# Patient Record
Sex: MALE | Race: OTHER | URBAN - METROPOLITAN AREA
[De-identification: names, ages, dates, MRNs, and addresses within clinical notes are randomized per-mention and may not be internally consistent; named-entity substitution may affect disease eponyms.]

---

## 2017-01-31 ENCOUNTER — OUTPATIENT (OUTPATIENT)
Dept: OUTPATIENT SERVICES | Facility: HOSPITAL | Age: 32
LOS: 1 days | Discharge: HOME | End: 2017-01-31

## 2017-06-27 DIAGNOSIS — Z01.21 ENCOUNTER FOR DENTAL EXAMINATION AND CLEANING WITH ABNORMAL FINDINGS: ICD-10-CM

## 2017-10-24 ENCOUNTER — RESULT REVIEW (OUTPATIENT)
Age: 32
End: 2017-10-24

## 2017-10-24 ENCOUNTER — APPOINTMENT (OUTPATIENT)
Dept: INTERNAL MEDICINE | Facility: CLINIC | Age: 32
End: 2017-10-24

## 2017-10-24 ENCOUNTER — OUTPATIENT (OUTPATIENT)
Dept: OUTPATIENT SERVICES | Facility: HOSPITAL | Age: 32
LOS: 1 days | Discharge: HOME | End: 2017-10-24

## 2017-10-24 VITALS
BODY MASS INDEX: 32.44 KG/M2 | TEMPERATURE: 97.2 F | DIASTOLIC BLOOD PRESSURE: 66 MMHG | HEART RATE: 65 BPM | SYSTOLIC BLOOD PRESSURE: 100 MMHG | HEIGHT: 64 IN | WEIGHT: 190 LBS

## 2017-10-24 DIAGNOSIS — Z78.9 OTHER SPECIFIED HEALTH STATUS: ICD-10-CM

## 2017-10-24 DIAGNOSIS — B35.4 TINEA CORPORIS: ICD-10-CM

## 2017-10-24 DIAGNOSIS — M79.1 MYALGIA: ICD-10-CM

## 2017-10-24 DIAGNOSIS — Z83.3 FAMILY HISTORY OF DIABETES MELLITUS: ICD-10-CM

## 2017-10-24 DIAGNOSIS — Z87.891 PERSONAL HISTORY OF NICOTINE DEPENDENCE: ICD-10-CM

## 2017-10-24 DIAGNOSIS — Z23 ENCOUNTER FOR IMMUNIZATION: ICD-10-CM

## 2017-10-24 DIAGNOSIS — Z82.49 FAMILY HISTORY OF ISCHEMIC HEART DISEASE AND OTHER DISEASES OF THE CIRCULATORY SYSTEM: ICD-10-CM

## 2017-10-25 DIAGNOSIS — B35.4 TINEA CORPORIS: ICD-10-CM

## 2017-10-25 DIAGNOSIS — Q98.4 KLINEFELTER SYNDROME, UNSPECIFIED: ICD-10-CM

## 2017-10-25 DIAGNOSIS — M79.1 MYALGIA: ICD-10-CM

## 2017-10-25 DIAGNOSIS — Z23 ENCOUNTER FOR IMMUNIZATION: ICD-10-CM

## 2018-01-22 LAB
ANION GAP SERPL CALC-SCNC: 10 MEQ/L
BASOPHILS # BLD: 0.02 TH/MM3
BASOPHILS NFR BLD: 0.3 %
BUN SERPL-MCNC: 15 MG/DL
BUN/CREAT SERPL: 20.3 %
CALCIUM SERPL-MCNC: 9.9 MG/DL
CHLORIDE SERPL-SCNC: 105 MEQ/L
CHOLEST SERPL-MCNC: 209 MG/DL
CO2 SERPL-SCNC: 27 MEQ/L
CREAT SERPL-MCNC: 0.74 MG/DL
DIFFERENTIAL METHOD BLD: NORMAL
EOSINOPHIL # BLD: 0.19 TH/MM3
EOSINOPHIL NFR BLD: 2.8 %
ERYTHROCYTE [DISTWIDTH] IN BLOOD BY AUTOMATED COUNT: 14.4 %
ESTIMATED AVERGAGE GLUCOSE (NORTH): 123 MG/DL
GFR SERPL CREATININE-BSD FRML MDRD: 122
GLUCOSE SERPL-MCNC: 92 MG/DL
GRANULOCYTES # BLD: 4.06 TH/MM3
GRANULOCYTES NFR BLD: 59 %
HBA1C MFR BLD: 5.9 %
HCT VFR BLD AUTO: 40.8 %
HDLC SERPL-MCNC: 57 MG/DL
HDLC SERPL: 3.67
HGB BLD-MCNC: 13.3 G/DL
IMM GRANULOCYTES # BLD: 0.01 TH/MM3
IMM GRANULOCYTES NFR BLD: 0.1 %
LDLC SERPL DIRECT ASSAY-MCNC: 132 MG/DL
LYMPHOCYTES # BLD: 1.91 TH/MM3
LYMPHOCYTES NFR BLD: 27.8 %
MCH RBC QN AUTO: 28.4 PG
MCHC RBC AUTO-ENTMCNC: 32.6 G/DL
MCV RBC AUTO: 87.2 FL
MONOCYTES # BLD: 0.69 TH/MM3
MONOCYTES NFR BLD: 10 %
PLATELET # BLD: 283 TH/MM3
PMV BLD AUTO: 10.5 FL
POTASSIUM SERPL-SCNC: 4.3 MMOL/L
RBC # BLD AUTO: 4.68 MIL/MM3
SODIUM SERPL-SCNC: 142 MEQ/L
TRIGL SERPL-MCNC: 82 MG/DL
TSH SERPL DL<=0.005 MIU/L-ACNC: 1.69 UIU/ML
VLDLC SERPL-MCNC: 16 MG/DL
WBC # BLD: 6.88 TH/MM3

## 2018-01-30 ENCOUNTER — APPOINTMENT (OUTPATIENT)
Dept: INTERNAL MEDICINE | Facility: CLINIC | Age: 33
End: 2018-01-30

## 2018-01-30 ENCOUNTER — OUTPATIENT (OUTPATIENT)
Dept: OUTPATIENT SERVICES | Facility: HOSPITAL | Age: 33
LOS: 1 days | Discharge: HOME | End: 2018-01-30

## 2018-01-30 VITALS — HEIGHT: 64 IN | BODY MASS INDEX: 32.44 KG/M2 | WEIGHT: 190 LBS

## 2018-01-30 VITALS — DIASTOLIC BLOOD PRESSURE: 90 MMHG | SYSTOLIC BLOOD PRESSURE: 125 MMHG | HEART RATE: 80 BPM

## 2018-01-30 DIAGNOSIS — R09.81 NASAL CONGESTION: ICD-10-CM

## 2018-01-30 DIAGNOSIS — R42 DIZZINESS AND GIDDINESS: ICD-10-CM

## 2018-03-27 ENCOUNTER — APPOINTMENT (OUTPATIENT)
Dept: UROLOGY | Facility: CLINIC | Age: 33
End: 2018-03-27
Payer: SELF-PAY

## 2018-03-27 VITALS
WEIGHT: 190 LBS | OXYGEN SATURATION: 97 % | HEIGHT: 64 IN | DIASTOLIC BLOOD PRESSURE: 78 MMHG | BODY MASS INDEX: 32.44 KG/M2 | TEMPERATURE: 98.3 F | SYSTOLIC BLOOD PRESSURE: 121 MMHG | HEART RATE: 61 BPM

## 2018-03-27 PROCEDURE — 99215 OFFICE O/P EST HI 40 MIN: CPT

## 2018-03-27 RX ORDER — SILDENAFIL 20 MG/1
20 TABLET ORAL
Qty: 90 | Refills: 11 | Status: ACTIVE | COMMUNITY
Start: 2018-03-27 | End: 1900-01-01

## 2018-03-28 LAB
ESTRADIOL SERPL-MCNC: 9 PG/ML
FSH SERPL-MCNC: 33.8 IU/L
LH SERPL-ACNC: 21.1 IU/L

## 2018-04-02 LAB
TESTOST BND SERPL-MCNC: 0.9 PG/ML
TESTOST SERPL-MCNC: 31.8 NG/DL

## 2018-04-03 ENCOUNTER — APPOINTMENT (OUTPATIENT)
Dept: UROLOGY | Facility: CLINIC | Age: 33
End: 2018-04-03
Payer: SELF-PAY

## 2018-04-03 VITALS — SYSTOLIC BLOOD PRESSURE: 113 MMHG | HEART RATE: 80 BPM | DIASTOLIC BLOOD PRESSURE: 77 MMHG | TEMPERATURE: 98.9 F

## 2018-04-03 PROCEDURE — 99214 OFFICE O/P EST MOD 30 MIN: CPT

## 2018-04-24 ENCOUNTER — APPOINTMENT (OUTPATIENT)
Dept: INTERNAL MEDICINE | Facility: CLINIC | Age: 33
End: 2018-04-24

## 2018-04-24 ENCOUNTER — APPOINTMENT (OUTPATIENT)
Dept: DERMATOLOGY | Facility: CLINIC | Age: 33
End: 2018-04-24

## 2018-04-28 ENCOUNTER — LABORATORY RESULT (OUTPATIENT)
Age: 33
End: 2018-04-28

## 2018-05-22 ENCOUNTER — APPOINTMENT (OUTPATIENT)
Dept: UROLOGY | Facility: CLINIC | Age: 33
End: 2018-05-22
Payer: SELF-PAY

## 2018-05-22 VITALS
HEART RATE: 62 BPM | OXYGEN SATURATION: 97 % | HEIGHT: 64 IN | WEIGHT: 190 LBS | TEMPERATURE: 99.2 F | DIASTOLIC BLOOD PRESSURE: 76 MMHG | BODY MASS INDEX: 32.44 KG/M2 | SYSTOLIC BLOOD PRESSURE: 115 MMHG

## 2018-05-22 PROCEDURE — 99214 OFFICE O/P EST MOD 30 MIN: CPT

## 2018-06-28 ENCOUNTER — APPOINTMENT (OUTPATIENT)
Dept: UROLOGY | Facility: CLINIC | Age: 33
End: 2018-06-28
Payer: SELF-PAY

## 2018-06-28 VITALS
BODY MASS INDEX: 32.44 KG/M2 | HEART RATE: 71 BPM | OXYGEN SATURATION: 97 % | HEIGHT: 64 IN | SYSTOLIC BLOOD PRESSURE: 112 MMHG | DIASTOLIC BLOOD PRESSURE: 73 MMHG | TEMPERATURE: 99.1 F | WEIGHT: 190 LBS

## 2018-06-28 PROCEDURE — 99213 OFFICE O/P EST LOW 20 MIN: CPT

## 2018-07-07 ENCOUNTER — EMERGENCY (EMERGENCY)
Facility: HOSPITAL | Age: 33
LOS: 0 days | Discharge: HOME | End: 2018-07-07
Attending: EMERGENCY MEDICINE | Admitting: EMERGENCY MEDICINE

## 2018-07-07 VITALS
TEMPERATURE: 97 F | SYSTOLIC BLOOD PRESSURE: 131 MMHG | DIASTOLIC BLOOD PRESSURE: 63 MMHG | HEART RATE: 84 BPM | RESPIRATION RATE: 18 BRPM | OXYGEN SATURATION: 98 %

## 2018-07-07 DIAGNOSIS — S71.131A PUNCTURE WOUND WITHOUT FOREIGN BODY, RIGHT THIGH, INITIAL ENCOUNTER: ICD-10-CM
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
## 2018-07-07 DIAGNOSIS — M79.651 PAIN IN RIGHT THIGH: ICD-10-CM

## 2018-07-07 RX ORDER — CEPHALEXIN 500 MG
1 CAPSULE ORAL
Qty: 40 | Refills: 0 | OUTPATIENT
Start: 2018-07-07 | End: 2018-07-16

## 2018-07-07 RX ORDER — IBUPROFEN 200 MG
1 TABLET ORAL
Qty: 21 | Refills: 0 | OUTPATIENT
Start: 2018-07-07 | End: 2018-07-13

## 2018-07-07 NOTE — ED PROVIDER NOTE - PROGRESS NOTE DETAILS
sono no discernable abscess, Pt aware that we will have official radiology read pending, and may result in change of xray read.  Pt voices understanding of use of medications, instructions for care, and reasons to return or to go to ED  plan for abx, NSAID  go to  for admin of meds, no more self admin unless further educated by  on technique Pt extensively counseled - warning si/sxs d/w pt. Case d/w pt at length including risks vs benefits of different management/treatment options. Pt instructed to return to ed for re-evaluation or f/u with PCP should sxs persist. Pt verbalizes an understanding & agreement with the plan as discussed

## 2018-07-07 NOTE — ED PROVIDER NOTE - OBJECTIVE STATEMENT
pt recently started testosterone therapy  had first injection IM left thigh by , administered first to self 3 days ago now c/o right thigh pain  no n/v/f/c, rash, laceration, d/c, etc

## 2018-07-07 NOTE — ED PROVIDER NOTE - ATTENDING CONTRIBUTION TO CARE
33 y M PMH infertility issues so started on outpatient testosterone injections - demonstrated by his doctor for how to inject at home, presenting after first home injection with pain in the injection area. No fever, chills, skin changes or spreading redness, pain with ambulation or leg movement.  Exam: RLE thigh with ecchymotic small area around punctum of injection site, no surrounding erythema, fluctuance, pain with ROM of leg, numbness, or weakness. distal CMS intact.   A/P: performed bedside superficial US -no fluid collection or FB identified. xray for possible foreign body/needle fragment in tissue.

## 2018-07-17 ENCOUNTER — RX RENEWAL (OUTPATIENT)
Age: 33
End: 2018-07-17

## 2018-07-26 ENCOUNTER — CLINICAL ADVICE (OUTPATIENT)
Age: 33
End: 2018-07-26

## 2018-07-26 PROBLEM — Z78.9 ALCOHOL USE: Status: ACTIVE | Noted: 2017-10-24

## 2018-07-28 ENCOUNTER — LABORATORY RESULT (OUTPATIENT)
Age: 33
End: 2018-07-28

## 2018-07-28 ENCOUNTER — OUTPATIENT (OUTPATIENT)
Dept: OUTPATIENT SERVICES | Facility: HOSPITAL | Age: 33
LOS: 1 days | Discharge: HOME | End: 2018-07-28

## 2018-07-28 DIAGNOSIS — N46.9 MALE INFERTILITY, UNSPECIFIED: ICD-10-CM

## 2018-07-28 PROBLEM — E29.1 TESTICULAR HYPOFUNCTION: Chronic | Status: ACTIVE | Noted: 2018-07-07

## 2018-08-14 ENCOUNTER — APPOINTMENT (OUTPATIENT)
Dept: INTERNAL MEDICINE | Facility: CLINIC | Age: 33
End: 2018-08-14

## 2018-08-14 ENCOUNTER — OUTPATIENT (OUTPATIENT)
Dept: OUTPATIENT SERVICES | Facility: HOSPITAL | Age: 33
LOS: 1 days | Discharge: HOME | End: 2018-08-14

## 2018-08-14 VITALS
TEMPERATURE: 98.2 F | SYSTOLIC BLOOD PRESSURE: 139 MMHG | WEIGHT: 200 LBS | BODY MASS INDEX: 34.15 KG/M2 | HEART RATE: 79 BPM | HEIGHT: 64 IN | DIASTOLIC BLOOD PRESSURE: 73 MMHG

## 2018-08-14 NOTE — ASSESSMENT
[FreeTextEntry1] : 34 y/o M with PMHx prediabetes and Klinefelter syndrome, presents for a right leg swelling.\par \par # Right leg swelling:\par - probably side effect of testosterone injections\par - Dose was fixed by his urologist\par - F/up in 6 months\par \par # Prediabetes:\par - Discussed diet\par - Ordered lab in January 2019\par \par # Dyslipidemia:\par - Discussed diet\par - Ordered lab in January 2019\par \par # Klinefelter syndrome:\par - C/w testo injection\par - F/up with urology as indicated\par

## 2018-08-14 NOTE — PHYSICAL EXAM

## 2018-08-14 NOTE — HISTORY OF PRESENT ILLNESS
[FreeTextEntry1] : right lower extremity edema [de-identified] : 34 y/o M with PMHx of prediabetes, Klinefelter syndrome, presents to the clinic for right leg edema. It is a low volume edema, barely visible to the eye, pitting in nature. The edema is also noted in the left foot but milder than the right foot. He also mentions that whenver he puts socks or shoes, he feels his leg swollen. The patient contacted his urlogist and the dose of testosterone was decreased from 0.4 to 0.5. Otherwise, the patient feels healthy, he thinks he has more strength, now that he is injecting testosterone.\par No chest pain, SOB, dizziness, HA, abd pain or urinary sx.

## 2018-08-15 DIAGNOSIS — R73.03 PREDIABETES: ICD-10-CM

## 2018-08-15 DIAGNOSIS — Q98.4 KLINEFELTER SYNDROME, UNSPECIFIED: ICD-10-CM

## 2018-08-15 DIAGNOSIS — E78.00 PURE HYPERCHOLESTEROLEMIA, UNSPECIFIED: ICD-10-CM

## 2018-08-20 ENCOUNTER — MEDICATION RENEWAL (OUTPATIENT)
Age: 33
End: 2018-08-20

## 2018-09-27 ENCOUNTER — APPOINTMENT (OUTPATIENT)
Dept: UROLOGY | Facility: CLINIC | Age: 33
End: 2018-09-27
Payer: SELF-PAY

## 2018-09-27 VITALS
OXYGEN SATURATION: 98 % | TEMPERATURE: 98.9 F | BODY MASS INDEX: 34.15 KG/M2 | WEIGHT: 200 LBS | HEIGHT: 64 IN | SYSTOLIC BLOOD PRESSURE: 108 MMHG | HEART RATE: 69 BPM | DIASTOLIC BLOOD PRESSURE: 69 MMHG

## 2018-09-27 PROCEDURE — 99213 OFFICE O/P EST LOW 20 MIN: CPT

## 2018-10-01 ENCOUNTER — LABORATORY RESULT (OUTPATIENT)
Age: 33
End: 2018-10-01

## 2018-10-01 ENCOUNTER — OUTPATIENT (OUTPATIENT)
Dept: OUTPATIENT SERVICES | Facility: HOSPITAL | Age: 33
LOS: 1 days | Discharge: HOME | End: 2018-10-01

## 2018-10-01 DIAGNOSIS — N46.9 MALE INFERTILITY, UNSPECIFIED: ICD-10-CM

## 2018-11-01 ENCOUNTER — MEDICATION RENEWAL (OUTPATIENT)
Age: 33
End: 2018-11-01

## 2018-11-28 ENCOUNTER — MEDICATION RENEWAL (OUTPATIENT)
Age: 33
End: 2018-11-28

## 2018-12-11 ENCOUNTER — OUTPATIENT (OUTPATIENT)
Dept: OUTPATIENT SERVICES | Facility: HOSPITAL | Age: 33
LOS: 1 days | Discharge: HOME | End: 2018-12-11

## 2018-12-12 DIAGNOSIS — Z01.21 ENCOUNTER FOR DENTAL EXAMINATION AND CLEANING WITH ABNORMAL FINDINGS: ICD-10-CM

## 2018-12-21 ENCOUNTER — OUTPATIENT (OUTPATIENT)
Dept: OUTPATIENT SERVICES | Facility: HOSPITAL | Age: 33
LOS: 1 days | Discharge: HOME | End: 2018-12-21

## 2018-12-27 ENCOUNTER — MEDICATION RENEWAL (OUTPATIENT)
Age: 33
End: 2018-12-27

## 2019-01-15 ENCOUNTER — APPOINTMENT (OUTPATIENT)
Dept: INTERNAL MEDICINE | Facility: CLINIC | Age: 34
End: 2019-01-15

## 2019-01-25 ENCOUNTER — MEDICATION RENEWAL (OUTPATIENT)
Age: 34
End: 2019-01-25

## 2019-01-26 LAB
ALBUMIN SERPL ELPH-MCNC: 4.7 G/DL
ALP BLD-CCNC: 92 U/L
ALT SERPL-CCNC: 15 U/L
ANION GAP SERPL CALC-SCNC: 13 MMOL/L
AST SERPL-CCNC: 12 U/L
BASOPHILS # BLD AUTO: 0.04 K/UL
BASOPHILS NFR BLD AUTO: 0.5 %
BILIRUB SERPL-MCNC: 0.5 MG/DL
BUN SERPL-MCNC: 12 MG/DL
CALCIUM SERPL-MCNC: 9.5 MG/DL
CHLORIDE SERPL-SCNC: 99 MMOL/L
CHOLEST SERPL-MCNC: 154 MG/DL
CHOLEST/HDLC SERPL: 3.3 RATIO
CO2 SERPL-SCNC: 25 MMOL/L
CREAT SERPL-MCNC: 0.8 MG/DL
EOSINOPHIL # BLD AUTO: 0.16 K/UL
EOSINOPHIL NFR BLD AUTO: 1.9 %
ESTIMATED AVERAGE GLUCOSE: 123 MG/DL
GLUCOSE SERPL-MCNC: 100 MG/DL
HBA1C MFR BLD HPLC: 5.9 %
HCT VFR BLD CALC: 47.3 %
HDLC SERPL-MCNC: 46 MG/DL
HGB BLD-MCNC: 15.3 G/DL
IMM GRANULOCYTES NFR BLD AUTO: 0.1 %
LDLC SERPL CALC-MCNC: 104 MG/DL
LYMPHOCYTES # BLD AUTO: 2.03 K/UL
LYMPHOCYTES NFR BLD AUTO: 24.7 %
MAN DIFF?: NORMAL
MCHC RBC-ENTMCNC: 26.9 PG
MCHC RBC-ENTMCNC: 32.3 G/DL
MCV RBC AUTO: 83.1 FL
MONOCYTES # BLD AUTO: 0.67 K/UL
MONOCYTES NFR BLD AUTO: 8.2 %
NEUTROPHILS # BLD AUTO: 5.31 K/UL
NEUTROPHILS NFR BLD AUTO: 64.6 %
PLATELET # BLD AUTO: 308 K/UL
POTASSIUM SERPL-SCNC: 4.5 MMOL/L
PROT SERPL-MCNC: 7.2 G/DL
RBC # BLD: 5.69 M/UL
RBC # FLD: 16.7 %
SODIUM SERPL-SCNC: 137 MMOL/L
TRIGL SERPL-MCNC: 64 MG/DL
WBC # FLD AUTO: 8.22 K/UL

## 2019-01-30 ENCOUNTER — OUTPATIENT (OUTPATIENT)
Dept: OUTPATIENT SERVICES | Facility: HOSPITAL | Age: 34
LOS: 1 days | Discharge: HOME | End: 2019-01-30

## 2019-01-30 ENCOUNTER — APPOINTMENT (OUTPATIENT)
Dept: INTERNAL MEDICINE | Facility: CLINIC | Age: 34
End: 2019-01-30

## 2019-01-30 VITALS
BODY MASS INDEX: 32.27 KG/M2 | HEART RATE: 76 BPM | DIASTOLIC BLOOD PRESSURE: 72 MMHG | HEIGHT: 64 IN | TEMPERATURE: 96.8 F | SYSTOLIC BLOOD PRESSURE: 122 MMHG | WEIGHT: 189 LBS

## 2019-01-30 DIAGNOSIS — E78.00 PURE HYPERCHOLESTEROLEMIA, UNSPECIFIED: ICD-10-CM

## 2019-01-30 DIAGNOSIS — R73.03 PREDIABETES.: ICD-10-CM

## 2019-01-30 DIAGNOSIS — E66.9 OBESITY, UNSPECIFIED: ICD-10-CM

## 2019-01-30 RX ORDER — ANASTROZOLE TABLETS 1 MG/1
1 TABLET ORAL
Qty: 8 | Refills: 2 | Status: DISCONTINUED | COMMUNITY
Start: 2018-04-03 | End: 2019-01-30

## 2019-01-30 RX ORDER — FLUTICASONE PROPIONATE 50 UG/1
50 SPRAY, METERED NASAL
Qty: 1 | Refills: 0 | Status: DISCONTINUED | COMMUNITY
Start: 2018-01-30 | End: 2019-01-30

## 2019-01-30 RX ORDER — IBUPROFEN 600 MG/1
600 TABLET, FILM COATED ORAL 3 TIMES DAILY
Qty: 21 | Refills: 0 | Status: DISCONTINUED | COMMUNITY
Start: 2017-10-24 | End: 2019-01-30

## 2019-01-30 RX ORDER — MECLIZINE HYDROCHLORIDE 25 MG/1
25 TABLET ORAL
Qty: 60 | Refills: 3 | Status: DISCONTINUED | COMMUNITY
Start: 2018-01-30 | End: 2019-01-30

## 2019-01-30 RX ORDER — CLOTRIMAZOLE 10 MG/G
1 CREAM TOPICAL 3 TIMES DAILY
Qty: 1 | Refills: 2 | Status: DISCONTINUED | COMMUNITY
Start: 2017-10-24 | End: 2019-01-30

## 2019-01-30 RX ORDER — CLOMIPHENE CITRATE 50 MG/1
50 TABLET ORAL
Qty: 30 | Refills: 2 | Status: DISCONTINUED | COMMUNITY
Start: 2018-04-03 | End: 2019-01-30

## 2019-01-30 NOTE — ASSESSMENT
[FreeTextEntry1] : 32 y/o M with PMHx prediabetes and Klinefelter syndrome, presents for routine follow up and recent lab work results. Lab work essentially normal (Jan 2019). Pt wants to get type and screen done. \par \par #Prediabetes:\par - HbA1c 5.9\par -Discussed diet and exercise\par \par # Dyslipidemia:\par -Lipid profile wnl\par -Diet control \par \par #Epistaxis, recurrent\par -Normal platelet count\par -Start saline nasal spray- use 2-3 times a day as needed\par -f/u in 6 months\par \par # Klinefelter syndrome:\par - C/w testo injection\par - F/up with urology as indicated\par \par #Right leg swelling\par -resolved\par \par #HCM\par -Flu vaccine administered today\par -No indication for screening colonoscopy\par -lab work uptodate\par \par RTC 6 months

## 2019-01-30 NOTE — HISTORY OF PRESENT ILLNESS
[[employer] ] : provided by [employer]   [FreeTextEntry1] : [id number] [FreeTextEntry2] : Otto [de-identified] : 34 y/o M with PMHx of prediabetes, Klinefelter syndrome, presents to the clinic for routine follow up and to know the results of last blood work. \par Pt does not have any complaints today. Pt regularly follows up with urologist for Klinefelter management and takes testosterone. \par Pt wants to get his blood type testing done, as requested by his urologist as per the pt. \par Complains of episodic epistaxis--bleeds for about 1 min, when he gets hot especially, no relation with seasons

## 2019-01-30 NOTE — PHYSICAL EXAM
[No Acute Distress] : no acute distress [Well Nourished] : well nourished [Well Developed] : well developed [Normal Sclera/Conjunctiva] : normal sclera/conjunctiva [PERRL] : pupils equal round and reactive to light [Normal Outer Ear/Nose] : the outer ears and nose were normal in appearance [Normal Oropharynx] : the oropharynx was normal [No JVD] : no jugular venous distention [Supple] : supple [No Lymphadenopathy] : no lymphadenopathy [No Respiratory Distress] : no respiratory distress  [Clear to Auscultation] : lungs were clear to auscultation bilaterally [No Accessory Muscle Use] : no accessory muscle use [Normal Rate] : normal rate  [Regular Rhythm] : with a regular rhythm [Normal S1, S2] : normal S1 and S2 [No Murmur] : no murmur heard [No Edema] : there was no peripheral edema [Soft] : abdomen soft [Non Tender] : non-tender [Normal Bowel Sounds] : normal bowel sounds [No Spinal Tenderness] : no spinal tenderness [No Joint Swelling] : no joint swelling [Grossly Normal Strength/Tone] : grossly normal strength/tone [Normal Gait] : normal gait [Coordination Grossly Intact] : coordination grossly intact [No Focal Deficits] : no focal deficits [Normal Affect] : the affect was normal [Normal Insight/Judgement] : insight and judgment were intact [de-identified] : mild distension [de-identified] : red bumps on the left arm

## 2019-01-31 LAB — ABO + RH PNL BLD: NORMAL

## 2019-02-22 ENCOUNTER — MEDICATION RENEWAL (OUTPATIENT)
Age: 34
End: 2019-02-22

## 2019-03-26 ENCOUNTER — APPOINTMENT (OUTPATIENT)
Dept: UROLOGY | Facility: CLINIC | Age: 34
End: 2019-03-26

## 2019-03-26 ENCOUNTER — MEDICATION RENEWAL (OUTPATIENT)
Age: 34
End: 2019-03-26

## 2019-03-27 ENCOUNTER — OUTPATIENT (OUTPATIENT)
Dept: OUTPATIENT SERVICES | Facility: HOSPITAL | Age: 34
LOS: 1 days | Discharge: HOME | End: 2019-03-27

## 2019-03-28 DIAGNOSIS — K01.1 IMPACTED TEETH: ICD-10-CM

## 2019-04-12 ENCOUNTER — APPOINTMENT (OUTPATIENT)
Dept: UROLOGY | Facility: CLINIC | Age: 34
End: 2019-04-12
Payer: SELF-PAY

## 2019-04-12 VITALS — TEMPERATURE: 98.5 F | SYSTOLIC BLOOD PRESSURE: 113 MMHG | HEART RATE: 66 BPM | DIASTOLIC BLOOD PRESSURE: 73 MMHG

## 2019-04-12 PROCEDURE — 99213 OFFICE O/P EST LOW 20 MIN: CPT

## 2019-04-12 NOTE — PHYSICAL EXAM
[FreeTextEntry1] : no gynecomastia [Urethral Meatus] : meatus normal [Penis Abnormality] : normal uncircumcised penis [Urinary Bladder Findings] : the bladder was normal on palpation [Scrotum] : the scrotum was normal

## 2019-04-12 NOTE — HISTORY OF PRESENT ILLNESS
[FreeTextEntry1] : hx KS\par slef increased IM T to 0.5 ml\par feels great\par CBC - HCT 47\par TT pending

## 2019-04-17 ENCOUNTER — MEDICATION RENEWAL (OUTPATIENT)
Age: 34
End: 2019-04-17

## 2019-04-17 LAB
TESTOST BND SERPL-MCNC: 16.4 PG/ML
TESTOST SERPL-MCNC: 664.8 NG/DL

## 2019-05-15 ENCOUNTER — OUTPATIENT (OUTPATIENT)
Dept: OUTPATIENT SERVICES | Facility: HOSPITAL | Age: 34
LOS: 1 days | Discharge: HOME | End: 2019-05-15

## 2019-06-13 ENCOUNTER — MEDICATION RENEWAL (OUTPATIENT)
Age: 34
End: 2019-06-13

## 2019-06-24 ENCOUNTER — MEDICATION RENEWAL (OUTPATIENT)
Age: 34
End: 2019-06-24

## 2019-08-14 ENCOUNTER — MEDICATION RENEWAL (OUTPATIENT)
Age: 34
End: 2019-08-14

## 2019-09-04 ENCOUNTER — OUTPATIENT (OUTPATIENT)
Dept: OUTPATIENT SERVICES | Facility: HOSPITAL | Age: 34
LOS: 1 days | Discharge: HOME | End: 2019-09-04

## 2019-09-04 ENCOUNTER — APPOINTMENT (OUTPATIENT)
Dept: INTERNAL MEDICINE | Facility: CLINIC | Age: 34
End: 2019-09-04

## 2019-09-04 VITALS
WEIGHT: 189 LBS | BODY MASS INDEX: 32.27 KG/M2 | SYSTOLIC BLOOD PRESSURE: 111 MMHG | HEIGHT: 64 IN | HEART RATE: 57 BPM | DIASTOLIC BLOOD PRESSURE: 74 MMHG | TEMPERATURE: 96.7 F

## 2019-09-04 DIAGNOSIS — E78.00 PURE HYPERCHOLESTEROLEMIA, UNSPECIFIED: ICD-10-CM

## 2019-09-04 DIAGNOSIS — Z87.898 PERSONAL HISTORY OF OTHER SPECIFIED CONDITIONS: ICD-10-CM

## 2019-09-04 DIAGNOSIS — Q98.4 KLINEFELTER SYNDROME, UNSPECIFIED: ICD-10-CM

## 2019-09-04 DIAGNOSIS — R73.03 PREDIABETES: ICD-10-CM

## 2019-09-04 NOTE — PHYSICAL EXAM
[No Focal Deficits] : no focal deficits [Normal] : affect was normal and insight and judgment were intact

## 2019-09-05 NOTE — HISTORY OF PRESENT ILLNESS
[FreeTextEntry1] : Follow up\par 's Number: [id number]\par 's Name: Ricardo [de-identified] : 34 y/o M with PMHx of prediabetes, Klinefelter syndrome, presents to the clinic for routine follow up.\par \par Pt does not have any complaints today. Pt regularly follows up with urologist for Klinefelter management and takes testosterone. \par Pt inquiring regarding possibility to have children with his condition.

## 2019-09-05 NOTE — ASSESSMENT
[FreeTextEntry1] : 32 y/o M with PMHx prediabetes and Klinefelter syndrome, presents for routine follow up and recent lab work results. Lab work essentially normal (Jan 2019). Pt wants to get type and screen done. \par \par #Prediabetes:\par - lost 11 lbs since 9/2018\par - HbA1c 5.9 1/2019, will repeat before next visit\par - Discussed diet and exercise\par \par # Dyslipidemia:\par -Lipid profile wnl 1/2019, will repeat before next visit\par -Diet control discussed\par \par # Klinefelter syndrome:\par - C/w testo injection\par - F/up with urology to discuss fertility prognosis and options\par \par #Epistaxis- resolved\par \par #HCM\par -Flu vaccine administered today\par -No indication for screening colonoscopy\par -lab work to be completed before next visit\par \par RTC 6 months.

## 2019-09-24 ENCOUNTER — OUTPATIENT (OUTPATIENT)
Dept: OUTPATIENT SERVICES | Facility: HOSPITAL | Age: 34
LOS: 1 days | Discharge: HOME | End: 2019-09-24

## 2019-10-08 ENCOUNTER — MEDICATION RENEWAL (OUTPATIENT)
Age: 34
End: 2019-10-08

## 2019-10-25 ENCOUNTER — APPOINTMENT (OUTPATIENT)
Dept: UROLOGY | Facility: CLINIC | Age: 34
End: 2019-10-25
Payer: SELF-PAY

## 2019-10-25 VITALS — HEART RATE: 69 BPM | SYSTOLIC BLOOD PRESSURE: 117 MMHG | DIASTOLIC BLOOD PRESSURE: 75 MMHG | TEMPERATURE: 97.7 F

## 2019-10-25 PROCEDURE — 99213 OFFICE O/P EST LOW 20 MIN: CPT

## 2019-11-01 ENCOUNTER — OUTPATIENT (OUTPATIENT)
Dept: OUTPATIENT SERVICES | Facility: HOSPITAL | Age: 34
LOS: 1 days | Discharge: HOME | End: 2019-11-01

## 2019-12-06 ENCOUNTER — OUTPATIENT (OUTPATIENT)
Dept: OUTPATIENT SERVICES | Facility: HOSPITAL | Age: 34
LOS: 1 days | Discharge: HOME | End: 2019-12-06

## 2019-12-09 DIAGNOSIS — K02.62 DENTAL CARIES ON SMOOTH SURFACE PENETRATING INTO DENTIN: ICD-10-CM

## 2019-12-23 ENCOUNTER — MEDICATION RENEWAL (OUTPATIENT)
Age: 34
End: 2019-12-23

## 2020-02-14 ENCOUNTER — OUTPATIENT (OUTPATIENT)
Dept: OUTPATIENT SERVICES | Facility: HOSPITAL | Age: 35
LOS: 1 days | Discharge: HOME | End: 2020-02-14

## 2020-02-20 DIAGNOSIS — Z01.21 ENCOUNTER FOR DENTAL EXAMINATION AND CLEANING WITH ABNORMAL FINDINGS: ICD-10-CM

## 2020-03-04 ENCOUNTER — APPOINTMENT (OUTPATIENT)
Dept: INTERNAL MEDICINE | Facility: CLINIC | Age: 35
End: 2020-03-04
Payer: SELF-PAY

## 2020-03-04 ENCOUNTER — OUTPATIENT (OUTPATIENT)
Dept: OUTPATIENT SERVICES | Facility: HOSPITAL | Age: 35
LOS: 1 days | Discharge: HOME | End: 2020-03-04

## 2020-03-04 VITALS
WEIGHT: 189 LBS | HEIGHT: 64 IN | BODY MASS INDEX: 32.27 KG/M2 | HEART RATE: 61 BPM | DIASTOLIC BLOOD PRESSURE: 69 MMHG | TEMPERATURE: 96.9 F | SYSTOLIC BLOOD PRESSURE: 105 MMHG

## 2020-03-04 LAB
ALBUMIN SERPL ELPH-MCNC: 4.8 G/DL
ALP BLD-CCNC: 74 U/L
ALT SERPL-CCNC: 22 U/L
ANION GAP SERPL CALC-SCNC: 18 MMOL/L
AST SERPL-CCNC: 16 U/L
BASOPHILS # BLD AUTO: 0.04 K/UL
BASOPHILS NFR BLD AUTO: 0.5 %
BILIRUB SERPL-MCNC: 0.6 MG/DL
BUN SERPL-MCNC: 16 MG/DL
CALCIUM SERPL-MCNC: 9.9 MG/DL
CHLORIDE SERPL-SCNC: 101 MMOL/L
CHOLEST SERPL-MCNC: 182 MG/DL
CHOLEST/HDLC SERPL: 3.5 RATIO
CO2 SERPL-SCNC: 23 MMOL/L
CREAT SERPL-MCNC: 0.8 MG/DL
EOSINOPHIL # BLD AUTO: 0.22 K/UL
EOSINOPHIL NFR BLD AUTO: 2.9 %
ESTIMATED AVERAGE GLUCOSE: 126 MG/DL
GLUCOSE SERPL-MCNC: 99 MG/DL
HBA1C MFR BLD HPLC: 6 %
HCT VFR BLD CALC: 47.8 %
HDLC SERPL-MCNC: 52 MG/DL
HGB BLD-MCNC: 16.2 G/DL
IMM GRANULOCYTES NFR BLD AUTO: 0.3 %
LDLC SERPL CALC-MCNC: 130 MG/DL
LYMPHOCYTES # BLD AUTO: 2.18 K/UL
LYMPHOCYTES NFR BLD AUTO: 28.8 %
MAN DIFF?: NORMAL
MCHC RBC-ENTMCNC: 29.7 PG
MCHC RBC-ENTMCNC: 33.9 G/DL
MCV RBC AUTO: 87.7 FL
MONOCYTES # BLD AUTO: 0.5 K/UL
MONOCYTES NFR BLD AUTO: 6.6 %
NEUTROPHILS # BLD AUTO: 4.6 K/UL
NEUTROPHILS NFR BLD AUTO: 60.9 %
PLATELET # BLD AUTO: 272 K/UL
POTASSIUM SERPL-SCNC: 4.6 MMOL/L
PROT SERPL-MCNC: 7.7 G/DL
RBC # BLD: 5.45 M/UL
RBC # FLD: 13.6 %
SODIUM SERPL-SCNC: 142 MMOL/L
TRIGL SERPL-MCNC: 67 MG/DL
WBC # FLD AUTO: 7.56 K/UL

## 2020-03-04 PROCEDURE — XXXXX: CPT | Mod: GC

## 2020-03-23 NOTE — REVIEW OF SYSTEMS
[Fever] : no fever [Chills] : no chills [Fatigue] : no fatigue [Pain] : no pain [Vision Problems] : no vision problems [Itching] : no itching [Chest Pain] : no chest pain [Palpitations] : no palpitations [Claudication] : no  leg claudication [Shortness Of Breath] : no shortness of breath [Wheezing] : no wheezing [Cough] : no cough [Dyspnea on Exertion] : not dyspnea on exertion [Abdominal Pain] : no abdominal pain [Constipation] : no constipation [Vomiting] : no vomiting [Dysuria] : no dysuria [Incontinence] : no incontinence [Hematuria] : no hematuria [Mole Changes] : no mole changes [Headache] : no headache [Dizziness] : no dizziness

## 2020-03-23 NOTE — ASSESSMENT
[FreeTextEntry1] : 34 year old male with PMHx prediabetes and Klinefelter syndrome, presents for routine follow-up.\par \par #Prediabetes:\par HbA1c 5.9 1/2019, will repeat\par Discussed diet and exercise\par \par # Klinefelter syndrome\par C/w testo injection\par F/up with urology\par \par #HCM\par Received flu vaccine this year\par No indication for screening colonoscopy\par lab work to be completed before next visit\par RTC 6 months. \par

## 2020-03-23 NOTE — HISTORY OF PRESENT ILLNESS
[FreeTextEntry1] : Follow-up [de-identified] : 34 year old male with PMHx of Klinefelter syndrome presenting for follow-up. Still taking testosterone injections, a few weeks ago he started bleeding from left thigh after one injection. He applied pressure and it self resolved.\par Denies any other complaints

## 2020-08-27 ENCOUNTER — LABORATORY RESULT (OUTPATIENT)
Age: 35
End: 2020-08-27

## 2020-08-28 ENCOUNTER — APPOINTMENT (OUTPATIENT)
Dept: UROLOGY | Facility: CLINIC | Age: 35
End: 2020-08-28
Payer: SELF-PAY

## 2020-08-28 VITALS
HEART RATE: 57 BPM | TEMPERATURE: 98.2 F | DIASTOLIC BLOOD PRESSURE: 73 MMHG | BODY MASS INDEX: 32.27 KG/M2 | SYSTOLIC BLOOD PRESSURE: 111 MMHG | HEIGHT: 64 IN | WEIGHT: 189 LBS

## 2020-08-28 PROCEDURE — 99213 OFFICE O/P EST LOW 20 MIN: CPT

## 2020-08-28 NOTE — ASSESSMENT
[FreeTextEntry1] : klinefelters\par will call to review TROUGH labs by phone\par HCT 48\par normal LFTs\par f/u 6 months\par consider downward adjustment
acuteness of illness

## 2020-08-28 NOTE — HISTORY OF PRESENT ILLNESS
[FreeTextEntry1] : on T cyp q 7 days\par feels great\par awaiting T albs\par occasional emotional outbursts\par no breast tenderness \par excellent lbidio

## 2020-11-12 ENCOUNTER — APPOINTMENT (OUTPATIENT)
Dept: INTERNAL MEDICINE | Facility: CLINIC | Age: 35
End: 2020-11-12

## 2020-11-12 ENCOUNTER — MED ADMIN CHARGE (OUTPATIENT)
Age: 35
End: 2020-11-12

## 2020-11-12 ENCOUNTER — OUTPATIENT (OUTPATIENT)
Dept: OUTPATIENT SERVICES | Facility: HOSPITAL | Age: 35
LOS: 1 days | Discharge: HOME | End: 2020-11-12

## 2021-01-27 LAB
TESTOST BND SERPL-MCNC: 5.1 PG/ML
TESTOST SERPL-MCNC: 201.9 NG/DL

## 2021-01-28 ENCOUNTER — NON-APPOINTMENT (OUTPATIENT)
Age: 36
End: 2021-01-28

## 2021-02-11 ENCOUNTER — APPOINTMENT (OUTPATIENT)
Dept: UROLOGY | Facility: CLINIC | Age: 36
End: 2021-02-11
Payer: SELF-PAY

## 2021-02-11 VITALS — HEART RATE: 56 BPM | DIASTOLIC BLOOD PRESSURE: 70 MMHG | SYSTOLIC BLOOD PRESSURE: 105 MMHG | TEMPERATURE: 97.9 F

## 2021-02-11 PROCEDURE — 99213 OFFICE O/P EST LOW 20 MIN: CPT

## 2021-02-11 NOTE — ASSESSMENT
[FreeTextEntry1] : hypogonadsim\par stable on TRT\par will move to annual labs/follow up\par will check peak T - if low will consider adjusting medication\par otherwise f/u 1 year

## 2021-02-11 NOTE — HISTORY OF PRESENT ILLNESS
[FreeTextEntry1] : castrate /klinefelters\par feels well on TRT\par HCT 48\par trough (1 week late) 208\par feel swell\par no breast tenderness\par mild decrease in libido/energy

## 2022-02-24 LAB
ALBUMIN SERPL ELPH-MCNC: 4.7 G/DL
ALP BLD-CCNC: 78 U/L
ALT SERPL-CCNC: 26 U/L
ANION GAP SERPL CALC-SCNC: 13 MMOL/L
AST SERPL-CCNC: 15 U/L
BASOPHILS # BLD AUTO: 0.05 K/UL
BASOPHILS NFR BLD AUTO: 0.6 %
BILIRUB SERPL-MCNC: 0.4 MG/DL
BUN SERPL-MCNC: 14 MG/DL
CALCIUM SERPL-MCNC: 9.8 MG/DL
CHLORIDE SERPL-SCNC: 101 MMOL/L
CO2 SERPL-SCNC: 24 MMOL/L
CREAT SERPL-MCNC: 0.89 MG/DL
EOSINOPHIL # BLD AUTO: 0.16 K/UL
EOSINOPHIL NFR BLD AUTO: 1.9 %
ESTRADIOL SERPL-MCNC: 47 PG/ML
FSH SERPL-MCNC: 14.5 IU/L
GLUCOSE SERPL-MCNC: 84 MG/DL
HCT VFR BLD CALC: 44.9 %
HGB BLD-MCNC: 13.9 G/DL
IMM GRANULOCYTES NFR BLD AUTO: 0.4 %
LH SERPL-ACNC: 18.4 IU/L
LYMPHOCYTES # BLD AUTO: 3.02 K/UL
LYMPHOCYTES NFR BLD AUTO: 36.2 %
MAN DIFF?: NORMAL
MCHC RBC-ENTMCNC: 28.9 PG
MCHC RBC-ENTMCNC: 31 GM/DL
MCV RBC AUTO: 93.3 FL
MONOCYTES # BLD AUTO: 0.6 K/UL
MONOCYTES NFR BLD AUTO: 7.2 %
NEUTROPHILS # BLD AUTO: 4.48 K/UL
NEUTROPHILS NFR BLD AUTO: 53.7 %
PLATELET # BLD AUTO: 289 K/UL
POTASSIUM SERPL-SCNC: 4.5 MMOL/L
PROT SERPL-MCNC: 7 G/DL
RBC # BLD: 4.81 M/UL
RBC # FLD: 15 %
SODIUM SERPL-SCNC: 139 MMOL/L
TESTOST SERPL-MCNC: 677 NG/DL
WBC # FLD AUTO: 8.34 K/UL

## 2022-03-17 ENCOUNTER — APPOINTMENT (OUTPATIENT)
Dept: UROLOGY | Facility: CLINIC | Age: 37
End: 2022-03-17
Payer: SELF-PAY

## 2022-03-17 VITALS
WEIGHT: 195 LBS | TEMPERATURE: 97.8 F | HEIGHT: 64 IN | HEART RATE: 62 BPM | BODY MASS INDEX: 33.29 KG/M2 | DIASTOLIC BLOOD PRESSURE: 74 MMHG | SYSTOLIC BLOOD PRESSURE: 114 MMHG

## 2022-03-17 PROCEDURE — 99213 OFFICE O/P EST LOW 20 MIN: CPT

## 2022-03-17 NOTE — ASSESSMENT
[FreeTextEntry1] : Hypogonadism \par Continue Testosterone cypionate 0.5 ml weekly \par Stable labs \par  Feb 23, 2022 - peak level\par Will continue yearly lab checks\par \par Follow up yearly

## 2022-03-17 NOTE — HISTORY OF PRESENT ILLNESS
[FreeTextEntry1] : 37M with Klinefelter's returns for annual follow up \par Reports feeling very good, High energy levels \par Currently on Testosterone cypionate 0.5 ml weekly\par Tolerating TRT well \par  Feb 23, 2022 - peak level\par LH 18.4\par FSH 14.5 \par E2 47\par HCT 44.9%\par Denies breast tenderness\par Denies voiding problems\par

## 2022-03-21 LAB
APPEARANCE: CLEAR
BACTERIA UR CULT: NORMAL
BACTERIA: NEGATIVE
BILIRUBIN URINE: NEGATIVE
BLOOD URINE: NEGATIVE
COLOR: YELLOW
GLUCOSE QUALITATIVE U: NEGATIVE
HYALINE CASTS: 0 /LPF
KETONES URINE: NEGATIVE
LEUKOCYTE ESTERASE URINE: NEGATIVE
MICROSCOPIC-UA: NORMAL
NITRITE URINE: NEGATIVE
PH URINE: 6.5
PROTEIN URINE: NORMAL
RED BLOOD CELLS URINE: 1 /HPF
SPECIFIC GRAVITY URINE: 1.03
SQUAMOUS EPITHELIAL CELLS: 0 /HPF
UROBILINOGEN URINE: NORMAL
WHITE BLOOD CELLS URINE: 0 /HPF

## 2023-07-11 ENCOUNTER — APPOINTMENT (OUTPATIENT)
Dept: UROLOGY | Facility: CLINIC | Age: 38
End: 2023-07-11
Payer: SELF-PAY

## 2023-07-11 VITALS
DIASTOLIC BLOOD PRESSURE: 68 MMHG | HEART RATE: 62 BPM | OXYGEN SATURATION: 95 % | WEIGHT: 190 LBS | SYSTOLIC BLOOD PRESSURE: 103 MMHG | BODY MASS INDEX: 32.44 KG/M2 | TEMPERATURE: 98 F | HEIGHT: 64 IN

## 2023-07-11 DIAGNOSIS — Q98.4 KLINEFELTER SYNDROME, UNSPECIFIED: ICD-10-CM

## 2023-07-11 PROCEDURE — 99214 OFFICE O/P EST MOD 30 MIN: CPT

## 2023-07-11 RX ORDER — NEEDLES, FILTER 19GX1 1/2"
21G X 1-1/2" NEEDLE, DISPOSABLE MISCELLANEOUS
Qty: 30 | Refills: 11 | Status: ACTIVE | COMMUNITY
Start: 2018-05-22 | End: 1900-01-01

## 2023-07-11 NOTE — HISTORY OF PRESENT ILLNESS
[FreeTextEntry1] : 37M with Klinefelter's returns for annual follow up \par Currently on Testosterone cypionate 0.5 ml weekly\par \par Has not been taking T as consistently. Pharmacy changed needle size, which recently cause increased swelling, redness, and subjective fevers, now resolved.

## 2023-07-11 NOTE — ASSESSMENT
[FreeTextEntry1] : klinefelters\par castrate T at baseline\par resume T cyp as previous\par rx for needles and syringes\par TT LH E2 CBC CMP\par results by patrick\par f/u 1 year\par

## 2023-07-11 NOTE — PHYSICAL EXAM
[General Appearance - Well Developed] : well developed [General Appearance - Well Nourished] : well nourished [Normal Appearance] : normal appearance [] : no respiratory distress [Exaggerated Use Of Accessory Muscles For Inspiration] : no accessory muscle use [Oriented To Time, Place, And Person] : oriented to person, place, and time [Affect] : the affect was normal

## 2023-11-02 ENCOUNTER — OUTPATIENT (OUTPATIENT)
Dept: OUTPATIENT SERVICES | Facility: HOSPITAL | Age: 38
LOS: 1 days | End: 2023-11-02
Payer: COMMERCIAL

## 2023-11-02 DIAGNOSIS — Z01.20 ENCOUNTER FOR DENTAL EXAMINATION AND CLEANING WITHOUT ABNORMAL FINDINGS: ICD-10-CM

## 2023-11-02 DIAGNOSIS — Z01.21 ENCOUNTER FOR DENTAL EXAMINATION AND CLEANING WITH ABNORMAL FINDINGS: ICD-10-CM

## 2023-11-02 PROCEDURE — D0120: CPT

## 2023-11-02 PROCEDURE — D0230: CPT

## 2023-11-02 PROCEDURE — D1110: CPT

## 2023-11-02 PROCEDURE — D0330: CPT

## 2024-06-20 RX ORDER — TESTOSTERONE CYPIONATE 200 MG/ML
200 INJECTION, SOLUTION INTRAMUSCULAR
Qty: 5 | Refills: 0 | Status: ACTIVE | COMMUNITY
Start: 2018-05-22 | End: 1900-01-01

## 2024-07-10 ENCOUNTER — APPOINTMENT (OUTPATIENT)
Dept: UROLOGY | Facility: CLINIC | Age: 39
End: 2024-07-10

## 2024-12-25 PROBLEM — F10.90 ALCOHOL USE: Status: ACTIVE | Noted: 2017-10-24

## 2025-05-22 ENCOUNTER — NON-APPOINTMENT (OUTPATIENT)
Age: 40
End: 2025-05-22

## 2025-05-22 ENCOUNTER — APPOINTMENT (OUTPATIENT)
Dept: UROLOGY | Facility: CLINIC | Age: 40
End: 2025-05-22
Payer: SELF-PAY

## 2025-05-22 VITALS
SYSTOLIC BLOOD PRESSURE: 120 MMHG | WEIGHT: 190 LBS | BODY MASS INDEX: 32.44 KG/M2 | DIASTOLIC BLOOD PRESSURE: 75 MMHG | HEART RATE: 73 BPM | HEIGHT: 64 IN | TEMPERATURE: 98.1 F

## 2025-05-22 DIAGNOSIS — Q98.4 KLINEFELTER SYNDROME, UNSPECIFIED: ICD-10-CM

## 2025-05-22 PROCEDURE — G2211 COMPLEX E/M VISIT ADD ON: CPT

## 2025-05-22 PROCEDURE — 99214 OFFICE O/P EST MOD 30 MIN: CPT

## 2025-05-24 LAB
ALBUMIN SERPL ELPH-MCNC: 4.6 G/DL
ALP BLD-CCNC: 102 U/L
ALT SERPL-CCNC: 31 U/L
ANION GAP SERPL CALC-SCNC: 13 MMOL/L
AST SERPL-CCNC: 19 U/L
BASOPHILS # BLD AUTO: 0.05 K/UL
BASOPHILS NFR BLD AUTO: 0.7 %
BILIRUB SERPL-MCNC: 0.2 MG/DL
BUN SERPL-MCNC: 17 MG/DL
CALCIUM SERPL-MCNC: 9.6 MG/DL
CHLORIDE SERPL-SCNC: 102 MMOL/L
CO2 SERPL-SCNC: 24 MMOL/L
CREAT SERPL-MCNC: 0.73 MG/DL
EGFRCR SERPLBLD CKD-EPI 2021: 117 ML/MIN/1.73M2
EOSINOPHIL # BLD AUTO: 0.27 K/UL
EOSINOPHIL NFR BLD AUTO: 3.8 %
ESTRADIOL SERPL-MCNC: 26 PG/ML
FSH SERPL-MCNC: 34.5 IU/L
GLUCOSE SERPL-MCNC: 122 MG/DL
HCT VFR BLD CALC: 48.4 %
HGB BLD-MCNC: 15 G/DL
IMM GRANULOCYTES NFR BLD AUTO: 0.3 %
LH SERPL-ACNC: 24.5 IU/L
LYMPHOCYTES # BLD AUTO: 2.3 K/UL
LYMPHOCYTES NFR BLD AUTO: 32 %
MAN DIFF?: NORMAL
MCHC RBC-ENTMCNC: 28.2 PG
MCHC RBC-ENTMCNC: 31 G/DL
MCV RBC AUTO: 91.1 FL
MONOCYTES # BLD AUTO: 0.53 K/UL
MONOCYTES NFR BLD AUTO: 7.4 %
NEUTROPHILS # BLD AUTO: 4.02 K/UL
NEUTROPHILS NFR BLD AUTO: 55.8 %
PLATELET # BLD AUTO: 326 K/UL
POTASSIUM SERPL-SCNC: 4.5 MMOL/L
PROT SERPL-MCNC: 7.5 G/DL
RBC # BLD: 5.31 M/UL
RBC # FLD: 14.5 %
SODIUM SERPL-SCNC: 139 MMOL/L
TESTOST FREE SERPL-MCNC: 1.3 PG/ML
TESTOST SERPL-MCNC: 16.8 NG/DL
WBC # FLD AUTO: 7.19 K/UL

## 2025-06-04 RX ORDER — CHORIONIC GONADOTROPIN 10000 UNIT
10000 KIT INTRAMUSCULAR
Qty: 40000 | Refills: 0 | Status: ACTIVE | COMMUNITY
Start: 2025-06-04 | End: 1900-01-01

## 2025-06-09 ENCOUNTER — NON-APPOINTMENT (OUTPATIENT)
Age: 40
End: 2025-06-09

## 2025-06-16 RX ORDER — CHORIONIC GONADOTROPIN 10000 UNIT
10000 KIT INTRAMUSCULAR
Qty: 4 | Refills: 0 | Status: ACTIVE | COMMUNITY
Start: 2025-06-16 | End: 1900-01-01

## 2025-06-17 RX ORDER — CHORIOGONADOTROPIN ALFA 10000 UNIT
10000 KIT INTRAMUSCULAR
Qty: 4 | Refills: 0 | Status: ACTIVE | COMMUNITY
Start: 2025-06-17 | End: 1900-01-01

## 2025-08-18 ENCOUNTER — APPOINTMENT (OUTPATIENT)
Dept: UROLOGY | Facility: CLINIC | Age: 40
End: 2025-08-18
Payer: SELF-PAY

## 2025-08-18 VITALS
BODY MASS INDEX: 32.44 KG/M2 | DIASTOLIC BLOOD PRESSURE: 69 MMHG | SYSTOLIC BLOOD PRESSURE: 111 MMHG | WEIGHT: 190 LBS | HEIGHT: 64 IN | HEART RATE: 60 BPM | TEMPERATURE: 97.2 F

## 2025-08-18 DIAGNOSIS — R97.20 BENIGN PROSTATIC HYPERPLASIA WITHOUT LOWER URINARY TRACT SYMPMS: ICD-10-CM

## 2025-08-18 DIAGNOSIS — N40.0 BENIGN PROSTATIC HYPERPLASIA WITHOUT LOWER URINARY TRACT SYMPMS: ICD-10-CM

## 2025-08-18 DIAGNOSIS — Q98.4 KLINEFELTER SYNDROME, UNSPECIFIED: ICD-10-CM

## 2025-08-18 PROCEDURE — 99213 OFFICE O/P EST LOW 20 MIN: CPT

## 2025-08-18 RX ORDER — SYRINGE, DISPOSABLE, 1 ML
20G X 1" SYRINGE, EMPTY DISPOSABLE MISCELLANEOUS
Qty: 15 | Refills: 10 | Status: ACTIVE | COMMUNITY
Start: 2025-08-18 | End: 1900-01-01

## 2025-08-18 RX ORDER — SYRINGE WITH NEEDLE, 1 ML 25GX5/8"
18G X 1-1/2" SYRINGE, EMPTY DISPOSABLE MISCELLANEOUS
Qty: 12 | Refills: 4 | Status: DISCONTINUED | COMMUNITY
Start: 2025-08-18 | End: 2025-08-18

## 2025-08-18 RX ORDER — 70%ISOPROPYL ALCOHOL 0.7 ML/ML
70 SWAB TOPICAL
Qty: 1 | Refills: 3 | Status: ACTIVE | COMMUNITY
Start: 2025-08-18 | End: 1900-01-01

## 2025-08-18 RX ORDER — SYRINGE W-NEEDLE,DISPOSAB,3 ML 25GX1"
25G X 1" SYRINGE, EMPTY DISPOSABLE MISCELLANEOUS
Qty: 12 | Refills: 10 | Status: ACTIVE | COMMUNITY
Start: 2025-08-18 | End: 1900-01-01

## 2025-08-20 LAB
APPEARANCE: CLEAR
BACTERIA UR CULT: NORMAL
BACTERIA: NEGATIVE /HPF
BILIRUBIN URINE: NEGATIVE
BLOOD URINE: NEGATIVE
CAST: 2 /LPF
COLOR: YELLOW
EPITHELIAL CELLS: 0 /HPF
GLUCOSE QUALITATIVE U: NEGATIVE MG/DL
KETONES URINE: ABNORMAL MG/DL
LEUKOCYTE ESTERASE URINE: NEGATIVE
MICROSCOPIC-UA: NORMAL
NITRITE URINE: NEGATIVE
PH URINE: 5.5
PROTEIN URINE: NORMAL MG/DL
RED BLOOD CELLS URINE: 0 /HPF
SPECIFIC GRAVITY URINE: >1.03
UROBILINOGEN URINE: 0.2 MG/DL
WHITE BLOOD CELLS URINE: 0 /HPF

## 2025-09-18 ENCOUNTER — LABORATORY RESULT (OUTPATIENT)
Age: 40
End: 2025-09-18